# Patient Record
Sex: MALE | Race: BLACK OR AFRICAN AMERICAN | NOT HISPANIC OR LATINO | URBAN - METROPOLITAN AREA
[De-identification: names, ages, dates, MRNs, and addresses within clinical notes are randomized per-mention and may not be internally consistent; named-entity substitution may affect disease eponyms.]

---

## 2019-01-10 ENCOUNTER — OFFICE VISIT (OUTPATIENT)
Dept: DERMATOLOGY | Facility: CLINIC | Age: 24
End: 2019-01-10
Payer: COMMERCIAL

## 2019-01-10 DIAGNOSIS — L70.0 ACNE VULGARIS: Primary | ICD-10-CM

## 2019-01-10 DIAGNOSIS — B36.0 TINEA VERSICOLOR: ICD-10-CM

## 2019-01-10 PROCEDURE — 99999 PR PBB SHADOW E&M-NEW PATIENT-LVL II: CPT | Mod: PBBFAC,,, | Performed by: NURSE PRACTITIONER

## 2019-01-10 PROCEDURE — 99999 PR PBB SHADOW E&M-NEW PATIENT-LVL II: ICD-10-PCS | Mod: PBBFAC,,, | Performed by: NURSE PRACTITIONER

## 2019-01-10 PROCEDURE — 99202 OFFICE O/P NEW SF 15 MIN: CPT | Mod: S$GLB,,, | Performed by: NURSE PRACTITIONER

## 2019-01-10 PROCEDURE — 99202 PR OFFICE/OUTPT VISIT, NEW, LEVL II, 15-29 MIN: ICD-10-PCS | Mod: S$GLB,,, | Performed by: NURSE PRACTITIONER

## 2019-01-10 RX ORDER — DOXYCYCLINE 100 MG/1
TABLET ORAL
Qty: 30 TABLET | Refills: 2 | Status: SHIPPED | OUTPATIENT
Start: 2019-01-10

## 2019-01-10 RX ORDER — KETOCONAZOLE 20 MG/G
CREAM TOPICAL
Qty: 60 G | Refills: 1 | Status: SHIPPED | OUTPATIENT
Start: 2019-01-10

## 2019-01-10 RX ORDER — ADAPALENE AND BENZOYL PEROXIDE 3; 25 MG/G; MG/G
GEL TOPICAL
Qty: 45 G | Refills: 3 | Status: SHIPPED | OUTPATIENT
Start: 2019-01-10

## 2019-01-10 NOTE — PROGRESS NOTES
"  Subjective:       Patient ID:  Robbi Mac is a 23 y.o. male who presents for   Chief Complaint   Patient presents with    Acne     Face, x 1 year, rx topical cream for treatment     Acne  - Initial  Affected locations: face and neck  Duration: 5 years  Signs / symptoms: itching and dryness  Aggravated by: picking  Treatments tried: washes face once daily with Neutrogena non-medicated cleanser. Has been on Rx in the past but does not know which one.  Improvement on treatment: no relief    Pt is also c/o "dark spots" on his back x 2 yrs. No associated itching.    Review of Systems   Constitutional: Negative for fever, chills and fatigue.   Skin: Negative for daily sunscreen use, activity-related sunscreen use and recent sunburn.   Hematologic/Lymphatic: Does not bruise/bleed easily.        Objective:    Physical Exam   Constitutional: He appears well-developed and well-nourished. No distress.   Neurological: He is alert and oriented to person, place, and time. He is not disoriented.   Psychiatric: He has a normal mood and affect.   Skin:   Areas Examined (abnormalities noted in diagram):   Head / Face Inspection Performed  Neck Inspection Performed  Chest / Axilla Inspection Performed  Back Inspection Performed  RUE Inspected  LUE Inspection Performed                   Diagram Legend     Erythematous scaling macule/papule c/w actinic keratosis       Vascular papule c/w angioma      Pigmented verrucoid papule/plaque c/w seborrheic keratosis      Yellow umbilicated papule c/w sebaceous hyperplasia      Irregularly shaped tan macule c/w lentigo     1-2 mm smooth white papules consistent with Milia      Movable subcutaneous cyst with punctum c/w epidermal inclusion cyst      Subcutaneous movable cyst c/w pilar cyst      Firm pink to brown papule c/w dermatofibroma      Pedunculated fleshy papule(s) c/w skin tag(s)      Evenly pigmented macule c/w junctional nevus     Mildly variegated pigmented, slightly " irregular-bordered macule c/w mildly atypical nevus      Flesh colored to evenly pigmented papule c/w intradermal nevus       Pink pearly papule/plaque c/w basal cell carcinoma      Erythematous hyperkeratotic cursted plaque c/w SCC      Surgical scar with no sign of skin cancer recurrence      Open and closed comedones      Inflammatory papules and pustules      Verrucoid papule consistent consistent with wart     Erythematous eczematous patches and plaques     Dystrophic onycholytic nail with subungual debris c/w onychomycosis     Umbilicated papule    Erythematous-base heme-crusted tan verrucoid plaque consistent with inflamed seborrheic keratosis     Erythematous Silvery Scaling Plaque c/w Psoriasis     See annotation      Assessment / Plan:        Acne vulgaris  -     doxycycline monohydrate 100 mg Tab; Take 1 po qday  Dispense: 30 tablet; Refill: 2  -     EPIDUO FORTE 0.3-2.5 % GlwP; AAA face qhs  Dispense: 45 g; Refill: 3    Discussed benefits and risks of doxycyline therapy including but not limited to GI discomfort, esophageal irritation/ulceration, and increased sun sensitivity. Patient was counseled to take medicine with meals and at least 1 hour before lying down.     Wash face twice daily with Neutrogena facial cleanser.  Apply small amount of EpiduoForte q other night - increase to qhs as tolerated.  Recommend CeraVe facial moisturizer as needed for dryness.    Tinea versicolor  -     ketoconazole (NIZORAL) 2 % cream; AAA rash on chest and back bid x 2-4 wks  Dispense: 60 g; Refill: 1    Consider adding oral medication if no improvement at 1 month f/u appt.         Follow-up in about 1 month (around 2/10/2019).

## 2019-01-10 NOTE — PATIENT INSTRUCTIONS
Wash face twice daily with Neutrogena facial cleanser.    Take Doxycycline once daily.  - Take with meals and at least 1 hour before lying down.    Apply small amount of EpiduoForte every other night - increase to nightly as tolerated.    Recommend CeraVe facial moisturizer as needed for dryness.    Apply Ketoconazole cream twice daily to chest and back.    RETINOIDS           Your doctor has prescribed a topical retinoid for your skin. A retinoid is a vitamin A derived product used to treat a variety of skin conditions including acne, actinic keratoses (pre-skin cancers), uneven pigmentation from sun damage, fine lines and wrinkles, and enlarged pores.    How do they work?         Retinoids increase skin cell turn over from the normal 30 days to five or six days, minimizing clogged pores-the major factor in acne. Retinoids can also repair the DNA in cells damaged by the sun helping to even out skin pigmentation and clear pre-skin cancers. They can shrink oil glands and minimize the appearance of large pores. These effects can not be appreciated unless the medication is used on a consistent basis!    How do I use a retinoid?         After washing with a mild cleanser (Purpose, Aqua glycolic face cleanser, Cetaphil, Neutrogena deep cream cleanser), the skin should be moisturized with a non-retinol containing moisturizer such as Cerave PM. Then a thin layer of medication is applied to the forehead, nose cheeks, and chin (and around eyes if treating fine lines and wrinkles) at night. The amount of medication needed to cover the entire face should be no more than the size of a green pea. Irritation around the eyes can be treated with Vaseline at night.    What if my skin appears dry, red, and is peeling?          Retinoids do not cause dry skin but rather they cause the top layer of the skin the shed, giving an appearance of dry skin. In fact, new healthy skin cells are replacing older, damaged cells on the surface.  This usually occurs the first 2-4 weeks as the skin is adjusting to the medication. It is reasonable to use the medication every other night or even every 2 nights until your skin adjusts. You can use a MILD exfoliant to remove the peeling skin (Aveeno daily clarifying pads, Aqua glycolic face cream) and can apply a moisturizer throughout the day as needed. Retinoids come in a variety of strengths and vehicles and your doctor can find one best for you. If you cannot tolerate prescription strength retinoids, over the counter products with retinol may be beneficial. (Olay ProX wrinkle cream, NAYA deep wrinkle cream, Green Cream at [employer])    Will my skin be more sensitive in the sun?           You will need to use sunscreen with SPF 30 daily. Retinoids will cause the outermost layer of the skin to be thinner and thus more sensitive to ultraviolet rays. However, remember that over time, retinoids actually make the skin thicker by enhancing collagen deposition which protects the skin from sun damage.    When will I see results?            If you are using a retinoid for acne, you should see improvement in 6-8 weeks. Do not be alarmed if you find that your acne gets worse before it gets better-KEEP USING THE MEDICATION- this is a normal response and your acne will improve if you can stick with it.           If you are using the medication for anti-aging and skin dyspigmentation, you may see results in 3 months, but most effects are not visible until 6 months. Retinoids are clinically proven to reverse signs of aging, but only if used on a CONSTISTENT BASIS!             Remember that retinoids should not be used if you are pregnant.           Discontinue use 1 week prior to waxing, as skin is more likely to tear.

## 2020-12-31 ENCOUNTER — OFFICE VISIT (OUTPATIENT)
Dept: DERMATOLOGY | Facility: CLINIC | Age: 25
End: 2020-12-31
Payer: COMMERCIAL

## 2020-12-31 DIAGNOSIS — B36.0 TINEA VERSICOLOR: ICD-10-CM

## 2020-12-31 DIAGNOSIS — L70.0 ACNE VULGARIS: Primary | ICD-10-CM

## 2020-12-31 PROCEDURE — 1126F PR PAIN SEVERITY QUANTIFIED, NO PAIN PRESENT: ICD-10-PCS | Mod: S$GLB,,, | Performed by: DERMATOLOGY

## 2020-12-31 PROCEDURE — 99213 PR OFFICE/OUTPT VISIT, EST, LEVL III, 20-29 MIN: ICD-10-PCS | Mod: S$GLB,,, | Performed by: DERMATOLOGY

## 2020-12-31 PROCEDURE — 99999 PR PBB SHADOW E&M-EST. PATIENT-LVL III: ICD-10-PCS | Mod: PBBFAC,,, | Performed by: DERMATOLOGY

## 2020-12-31 PROCEDURE — 1126F AMNT PAIN NOTED NONE PRSNT: CPT | Mod: S$GLB,,, | Performed by: DERMATOLOGY

## 2020-12-31 PROCEDURE — 99213 OFFICE O/P EST LOW 20 MIN: CPT | Mod: S$GLB,,, | Performed by: DERMATOLOGY

## 2020-12-31 PROCEDURE — 99999 PR PBB SHADOW E&M-EST. PATIENT-LVL III: CPT | Mod: PBBFAC,,, | Performed by: DERMATOLOGY

## 2020-12-31 RX ORDER — TRETINOIN 0.5 MG/G
CREAM TOPICAL NIGHTLY
Qty: 20 G | Refills: 0 | Status: SHIPPED | OUTPATIENT
Start: 2020-12-31

## 2020-12-31 RX ORDER — TRETINOIN 0.25 MG/G
CREAM TOPICAL NIGHTLY
Status: CANCELLED | OUTPATIENT
Start: 2020-12-31

## 2020-12-31 RX ORDER — SELENIUM SULFIDE 22.5 MG/ML
SHAMPOO TOPICAL
Qty: 180 ML | Refills: 3 | Status: SHIPPED | OUTPATIENT
Start: 2020-12-31

## 2020-12-31 RX ORDER — ERYTHROMYCIN 20 MG/G
GEL TOPICAL
Qty: 30 G | Refills: 0 | Status: SHIPPED | OUTPATIENT
Start: 2020-12-31

## 2020-12-31 NOTE — PROGRESS NOTES
Subjective:       Patient ID:  Robbi Mac is a 25 y.o. male who presents for   Chief Complaint   Patient presents with    Acne     face     Patient is a 26 yo male present for Acne on Face present for 4 years.     Also has rash for months on the chest and arms.  No current tx.    Acne        Review of Systems   Constitutional: Negative for fever.   HENT: Negative for sore throat.    Respiratory: Negative for cough.    Skin: Positive for rash and dry skin.        Objective:    Physical Exam   Constitutional: He appears well-developed and well-nourished.   Eyes: No conjunctival no injection.   Neurological: He is alert and oriented to person, place, and time.   Psychiatric: He has a normal mood and affect.   Skin:   Areas Examined (abnormalities noted in diagram):   Head / Face Inspection Performed  Neck Inspection Performed  Chest / Axilla Inspection Performed  Abdomen Inspection Performed  RUE Inspected  LUE Inspection Performed                   Diagram Legend     Erythematous scaling macule/papule c/w actinic keratosis       Vascular papule c/w angioma      Pigmented verrucoid papule/plaque c/w seborrheic keratosis      Yellow umbilicated papule c/w sebaceous hyperplasia      Irregularly shaped tan macule c/w lentigo     1-2 mm smooth white papules consistent with Milia      Movable subcutaneous cyst with punctum c/w epidermal inclusion cyst      Subcutaneous movable cyst c/w pilar cyst      Firm pink to brown papule c/w dermatofibroma      Pedunculated fleshy papule(s) c/w skin tag(s)      Evenly pigmented macule c/w junctional nevus     Mildly variegated pigmented, slightly irregular-bordered macule c/w mildly atypical nevus      Flesh colored to evenly pigmented papule c/w intradermal nevus       Pink pearly papule/plaque c/w basal cell carcinoma      Erythematous hyperkeratotic cursted plaque c/w SCC      Surgical scar with no sign of skin cancer recurrence      Open and closed comedones      Inflammatory  papules and pustules      Verrucoid papule consistent consistent with wart     Erythematous eczematous patches and plaques     Dystrophic onycholytic nail with subungual debris c/w onychomycosis     Umbilicated papule    Erythematous-base heme-crusted tan verrucoid plaque consistent with inflamed seborrheic keratosis     Erythematous Silvery Scaling Plaque c/w Psoriasis     See annotation      Assessment / Plan:        Acne vulgaris  -     erythromycin with ethanoL (EMGEL) 2 % gel; AAA face qam  Dispense: 30 g; Refill: 0  -     tretinoin (RETIN-A) 0.05 % cream; Apply topically every evening. Start with qohs and move up to qhs after 2 weeks if not too dry.  Dispense: 20 g; Refill: 0  Reviewed with patient different treatment options and associated risks.  Reviewed with patient today acne treatment options of topicals, oral antibiotics, aldactone (if applicable), accutane, cosmetic PDT.  Pt prefers topicals and milder approach.  Proper application of medications and or care for affected area(s) and condition(s) reviewed.  Recommended a salicylic acid OTC wash to be used regularly for acne, keratosis pilaris, actinic keratoses, or as needed for oil control.    Tinea versicolor  Discussed with patient the etiology and pathogenesis of the disease or skin lesion(s) and possible treatments and aggravators.    Shower sooner than later after exercise, exertion, or sweating.  Can do wash cloth wipes if more convenient.  Sweat can cause irritation and may exacerbate skin conditions.  Use corn starch as a drying powder.  Chronic nature of this condition discussed with patient.  Proper application of medications and or care for affected area(s) and condition(s) reviewed.  ofelia sulf regularly.  For selenium sulfide or Selsun, use daily when spots are active for 3-5 minute soaks before rinsing off.  Then use weekly for prevention.  Explained to patient that tinea versicolor is a chronic condition and hypopigmentation can take  months to recolor or normalize.    Other orders  -     Cancel: tretinoin (RETIN-A) 0.025 % cream; Apply topically every evening.  Dispense:               Follow up in about 6 weeks (around 2/11/2021).

## 2021-01-22 ENCOUNTER — DOCUMENTATION ONLY (OUTPATIENT)
Dept: DERMATOLOGY | Facility: CLINIC | Age: 26
End: 2021-01-22

## 2021-04-28 ENCOUNTER — PATIENT MESSAGE (OUTPATIENT)
Dept: RESEARCH | Facility: HOSPITAL | Age: 26
End: 2021-04-28

## 2023-05-20 ENCOUNTER — OFFICE VISIT (OUTPATIENT)
Dept: URGENT CARE | Facility: CLINIC | Age: 28
End: 2023-05-20
Payer: COMMERCIAL

## 2023-05-20 VITALS
BODY MASS INDEX: 20.04 KG/M2 | HEART RATE: 55 BPM | HEIGHT: 70 IN | DIASTOLIC BLOOD PRESSURE: 85 MMHG | SYSTOLIC BLOOD PRESSURE: 134 MMHG | WEIGHT: 140 LBS | TEMPERATURE: 97 F | OXYGEN SATURATION: 100 % | RESPIRATION RATE: 16 BRPM

## 2023-05-20 DIAGNOSIS — Z11.3 SCREENING FOR STDS (SEXUALLY TRANSMITTED DISEASES): Primary | ICD-10-CM

## 2023-05-20 PROCEDURE — 80074 ACUTE HEPATITIS PANEL: CPT | Performed by: NURSE PRACTITIONER

## 2023-05-20 PROCEDURE — 87591 N.GONORRHOEAE DNA AMP PROB: CPT | Performed by: NURSE PRACTITIONER

## 2023-05-20 PROCEDURE — 99214 PR OFFICE/OUTPT VISIT, EST, LEVL IV, 30-39 MIN: ICD-10-PCS | Mod: S$GLB,,, | Performed by: NURSE PRACTITIONER

## 2023-05-20 PROCEDURE — 86592 SYPHILIS TEST NON-TREP QUAL: CPT | Performed by: NURSE PRACTITIONER

## 2023-05-20 PROCEDURE — 99214 OFFICE O/P EST MOD 30 MIN: CPT | Mod: S$GLB,,, | Performed by: NURSE PRACTITIONER

## 2023-05-20 PROCEDURE — 87389 HIV-1 AG W/HIV-1&-2 AB AG IA: CPT | Performed by: NURSE PRACTITIONER

## 2023-05-20 NOTE — PATIENT INSTRUCTIONS
We will call with results in the next 3 days   Avoid sexual activity until results are received      You must understand that you've received an Urgent Care treatment only and that you may be released before all your medical problems are known or treated. You, the patient, will arrange for follow up care as instructed.  If your condition worsens we recommend that you receive another evaluation at the emergency room immediately or contact your primary medical clinics after hours call service to discuss your concerns.  Please return here or go to the Emergency Department for any concerns or worsening of condition.

## 2023-05-20 NOTE — PROGRESS NOTES
"Subjective:      Patient ID: Robbi Mac Jr. is a 27 y.o. male.    Vitals:  height is 5' 10" (1.778 m) and weight is 63.5 kg (140 lb). His temperature is 97.1 °F (36.2 °C). His blood pressure is 134/85 and his pulse is 55 (abnormal). His respiration is 16 and oxygen saturation is 100%.     Chief Complaint: std testing    Pt is coming in for STD testing.   States he has not had any known exposure, but wanted to get tested today.  ROS   Objective:     Physical Exam   Constitutional: He is oriented to person, place, and time. He appears well-developed. No distress.   HENT:   Head: Normocephalic and atraumatic.   Ears:   Right Ear: External ear normal.   Left Ear: External ear normal.   Nose: Nose normal. No nasal deformity. No epistaxis.   Mouth/Throat: Oropharynx is clear and moist and mucous membranes are normal.   Eyes: Conjunctivae and lids are normal.   Neck: Trachea normal and phonation normal. Neck supple.   Cardiovascular: Regular rhythm.   Pulmonary/Chest: Effort normal.   Abdominal: Normal appearance. He exhibits no distension. Soft. There is no abdominal tenderness.   Musculoskeletal: Normal range of motion.         General: Normal range of motion.   Neurological: He is alert and oriented to person, place, and time. He has normal reflexes.   Skin: Skin is warm, dry, intact and not diaphoretic.   Psychiatric: His speech is normal and behavior is normal. Judgment and thought content normal.   Nursing note and vitals reviewed.    Assessment:     1. Screening for STDs (sexually transmitted diseases)        Plan:       Patient would like to wait until results are received before treating. No current symptoms at this time.     Screening for STDs (sexually transmitted diseases)  -     C. trachomatis/N. gonorrhoeae by AMP DNA [facility]; Urine  -     Trichomonas vaginalis, RNA, Qual, Urine (Males Only)  -     RPR  -     HIV 1/2 Ag/Ab (4th Gen)  -     HEPATITIS PANEL, ACUTE                Patient Instructions   We will " call with results in the next 3 days   Avoid sexual activity until results are received      You must understand that you've received an Urgent Care treatment only and that you may be released before all your medical problems are known or treated. You, the patient, will arrange for follow up care as instructed.  If your condition worsens we recommend that you receive another evaluation at the emergency room immediately or contact your primary medical clinics after hours call service to discuss your concerns.  Please return here or go to the Emergency Department for any concerns or worsening of condition.

## 2023-05-21 LAB
HAV IGM SERPL QL IA: NORMAL
HBV CORE IGM SERPL QL IA: NORMAL
HBV SURFACE AG SERPL QL IA: NORMAL
HCV AB SERPL QL IA: NORMAL
HIV 1+2 AB+HIV1 P24 AG SERPL QL IA: NORMAL

## 2023-05-22 LAB — RPR SER QL: NORMAL

## 2023-05-23 LAB
C TRACH DNA SPEC QL NAA+PROBE: NOT DETECTED
N GONORRHOEA DNA SPEC QL NAA+PROBE: NOT DETECTED

## 2023-05-26 ENCOUNTER — TELEPHONE (OUTPATIENT)
Dept: URGENT CARE | Facility: CLINIC | Age: 28
End: 2023-05-26
Payer: COMMERCIAL

## 2023-05-26 NOTE — TELEPHONE ENCOUNTER
Pt was seen on 5/20 for STI testing. All negative results. Trich is pending. Called patient at this time to review but no answer. I left a voice mail to call us back.

## 2025-01-29 ENCOUNTER — OFFICE VISIT (OUTPATIENT)
Dept: URGENT CARE | Facility: CLINIC | Age: 30
End: 2025-01-29
Payer: COMMERCIAL

## 2025-01-29 ENCOUNTER — HOSPITAL ENCOUNTER (OUTPATIENT)
Dept: RADIOLOGY | Facility: HOSPITAL | Age: 30
Discharge: HOME | End: 2025-01-29
Attending: FAMILY MEDICINE
Payer: COMMERCIAL

## 2025-01-29 ENCOUNTER — HOSPITAL ENCOUNTER (OUTPATIENT)
Dept: RADIOLOGY | Facility: OTHER | Age: 30
Discharge: HOME | End: 2025-01-29
Attending: FAMILY MEDICINE
Payer: COMMERCIAL

## 2025-01-29 VITALS
HEIGHT: 70 IN | SYSTOLIC BLOOD PRESSURE: 126 MMHG | RESPIRATION RATE: 19 BRPM | WEIGHT: 140 LBS | TEMPERATURE: 99 F | DIASTOLIC BLOOD PRESSURE: 79 MMHG | HEART RATE: 56 BPM | OXYGEN SATURATION: 100 % | BODY MASS INDEX: 20.04 KG/M2

## 2025-01-29 DIAGNOSIS — R10.31 RIGHT LOWER QUADRANT PAIN: ICD-10-CM

## 2025-01-29 DIAGNOSIS — R10.2 PELVIC PAIN: Primary | ICD-10-CM

## 2025-01-29 DIAGNOSIS — N50.811 RIGHT TESTICULAR PAIN: ICD-10-CM

## 2025-01-29 DIAGNOSIS — K83.8 DILATED INTRAHEPATIC BILE DUCT: ICD-10-CM

## 2025-01-29 LAB
BILIRUBIN, UA POC OHS: NEGATIVE
BLOOD, UA POC OHS: NEGATIVE
CLARITY, UA POC OHS: CLEAR
COLOR, UA POC OHS: YELLOW
GLUCOSE, UA POC OHS: NEGATIVE
KETONES, UA POC OHS: ABNORMAL
LEUKOCYTES, UA POC OHS: NEGATIVE
NITRITE, UA POC OHS: NEGATIVE
PH, UA POC OHS: 7
PROTEIN, UA POC OHS: 30
SPECIFIC GRAVITY, UA POC OHS: 1.02
UROBILINOGEN, UA POC OHS: 1

## 2025-01-29 PROCEDURE — 74177 CT ABD & PELVIS W/CONTRAST: CPT | Mod: TC

## 2025-01-29 PROCEDURE — 76870 US EXAM SCROTUM: CPT | Mod: TC

## 2025-01-29 PROCEDURE — 93975 VASCULAR STUDY: CPT | Mod: 26,,, | Performed by: RADIOLOGY

## 2025-01-29 PROCEDURE — 99214 OFFICE O/P EST MOD 30 MIN: CPT | Mod: 25,S$GLB,, | Performed by: FAMILY MEDICINE

## 2025-01-29 PROCEDURE — 74177 CT ABD & PELVIS W/CONTRAST: CPT | Mod: 26,,, | Performed by: RADIOLOGY

## 2025-01-29 PROCEDURE — 87491 CHLMYD TRACH DNA AMP PROBE: CPT | Performed by: FAMILY MEDICINE

## 2025-01-29 PROCEDURE — 25500020 PHARM REV CODE 255: Performed by: FAMILY MEDICINE

## 2025-01-29 PROCEDURE — 76870 US EXAM SCROTUM: CPT | Mod: 26,,, | Performed by: RADIOLOGY

## 2025-01-29 PROCEDURE — 81003 URINALYSIS AUTO W/O SCOPE: CPT | Mod: QW,S$GLB,, | Performed by: FAMILY MEDICINE

## 2025-01-29 PROCEDURE — 87086 URINE CULTURE/COLONY COUNT: CPT | Performed by: FAMILY MEDICINE

## 2025-01-29 PROCEDURE — 96372 THER/PROPH/DIAG INJ SC/IM: CPT | Mod: S$GLB,,, | Performed by: FAMILY MEDICINE

## 2025-01-29 RX ORDER — CEFTRIAXONE 500 MG/1
500 INJECTION, POWDER, FOR SOLUTION INTRAMUSCULAR; INTRAVENOUS
Status: COMPLETED | OUTPATIENT
Start: 2025-01-29 | End: 2025-01-29

## 2025-01-29 RX ORDER — DOXYCYCLINE HYCLATE 100 MG
100 TABLET ORAL 2 TIMES DAILY
Qty: 20 TABLET | Refills: 0 | Status: SHIPPED | OUTPATIENT
Start: 2025-01-29 | End: 2025-02-08

## 2025-01-29 RX ORDER — LIDOCAINE HYDROCHLORIDE 10 MG/ML
1 INJECTION, SOLUTION INFILTRATION; PERINEURAL
Status: COMPLETED | OUTPATIENT
Start: 2025-01-29 | End: 2025-01-29

## 2025-01-29 RX ORDER — IBUPROFEN 600 MG/1
600 TABLET ORAL EVERY 8 HOURS PRN
Qty: 30 TABLET | Refills: 0 | Status: SHIPPED | OUTPATIENT
Start: 2025-01-29 | End: 2025-02-08

## 2025-01-29 RX ADMIN — CEFTRIAXONE 500 MG: 500 INJECTION, POWDER, FOR SOLUTION INTRAMUSCULAR; INTRAVENOUS at 01:01

## 2025-01-29 RX ADMIN — IOHEXOL 65 ML: 350 INJECTION, SOLUTION INTRAVENOUS at 06:01

## 2025-01-29 RX ADMIN — LIDOCAINE HYDROCHLORIDE 1 ML: 10 INJECTION, SOLUTION INFILTRATION; PERINEURAL at 01:01

## 2025-01-29 RX ADMIN — IOHEXOL 25 ML: 350 INJECTION, SOLUTION INTRAVENOUS at 06:01

## 2025-01-29 NOTE — PROGRESS NOTES
"Subjective:      Patient ID: Robbi Mac Jr. is a 29 y.o. male.    Vitals:  height is 5' 10" (1.778 m) and weight is 63.5 kg (139 lb 15.9 oz). His oral temperature is 98.6 °F (37 °C). His blood pressure is 126/79 and his pulse is 56 (abnormal). His respiration is 19 and oxygen saturation is 100%.     Chief Complaint: Urinary Tract Infection (Pelvis pain. - Entered by patient) and Pelvic Pain    29 year old co pelvic pain that gas been going on since 01/26/2025. Pt stated he used tylenol to relieve pain. Pt stated tylenol did relieve pain for a little bit. No urinary problems    Pelvic Pain  This is a new problem. The current episode started in the past 7 days. The problem occurs daily. The problem has been gradually worsening. Nothing aggravates the symptoms. He has tried acetaminophen for the symptoms. The treatment provided mild relief.       Genitourinary:  Positive for pelvic pain.      Objective:     Physical Exam  Constitutional: Pt oriented to person, place, and time.  Non-toxic appearance.   Patient does not appear ill. No distress. normal  HENT: No icterus or facial swelling appreciated  Head: Normocephalic and atraumatic.   Nose: No congestion.   Pulmonary/Chest: Effort normal. No stridor. No respiratory distress.   Abdominal: Normal appearance. Abdomen exhibits no distension. + TTP RLQ, no rebound or guarding. There is palpable mass (1cm) inguinal mass, right side, non reducible. Soft. Mild TTP. No overlying erythema or drainage.   : no penile rashes or swelling. No scrotal swelling. There is palpable cordlike mass 1-2 cm posterior right scrotum. Mild TTP. No erythema. No scrotal rashes.   Musculoskeletal:         General: No swelling.   Neurological: no focal deficit. Patient is alert and oriented to person, place, and time.   Skin: Skin is not diaphoretic and not pale. no jaundice  Psychiatric: Patients behavior is normal. Mood, judgment and thought content normal.     Results for orders placed or " performed in visit on 01/29/25   POCT Urinalysis(Instrument)    Collection Time: 01/29/25 12:27 PM   Result Value Ref Range    Color, POC UA Yellow Yellow, Straw, Colorless    Clarity, POC UA Clear Clear    Glucose, POC UA Negative Negative    Bilirubin, POC UA Negative Negative    Ketones, POC UA Trace (A) Negative    Spec Grav POC UA 1.020 1.005 - 1.030    Blood, POC UA Negative Negative    pH, POC UA 7.0 5.0 - 8.0    Protein, POC UA 30 (A) Negative    Urobilinogen, POC UA 1.0 <=1.0    Nitrite, POC UA Negative Negative    WBC, POC UA Negative Negative   C. trachomatis/N. gonorrhoeae by AMP DNA [facility]; Urine    Collection Time: 01/29/25 12:50 PM   Result Value Ref Range    Chlamydia, Amplified DNA Detected (A) Not Detected    N gonorrhoeae, amplified DNA Not Detected Not Detected   Urine Culture High Risk    Collection Time: 01/29/25  1:13 PM    Specimen: Urine, Clean Catch   Result Value Ref Range    Urine Culture, Routine No growth        CT A/P     Impression:     Mild intrahepatic biliary ductal dilatation.  Consider nonemergent outpatient MRCP or ERCP.        Electronically signed by:Nirmala Nielson  Date:                                            01/29/2025  Time:                                           19:43    Narrative & Impression  EXAMINATION:  US SCROTUM AND TESTICLES WITH DOPPLER (XPD)     CLINICAL HISTORY:  Right testicular pain     TECHNIQUE:  Sonography of the scrotum and testes.     COMPARISON:  None     FINDINGS:  Right Testicle:     - size: 4.0 x 2.9 x 3.2 cm     - appearance: Normal.     - flow: Normal arterial and venous flow     - epididymis: Normal.     - hydrocele: Small.     - varicocele: Present.     Left Testicle:     - size: 3.1 x 3.6 x 3.2 cm     - appearance: Normal.     - flow: Normal arterial and venous flow     - epididymis: Normal.     - hydrocele: None.     - varicocele: None.     Other findings: None.     Impression:     Small right hydrocele.  Right varicocele.     Normal  appearance of the testicles.     Electronically signed by resident: Bhaskar Echevarria  Date:                                            01/29/2025  Time:                                           16:23     Electronically signed by:Jason Kamara  Date:                                            01/29/2025  Time:                                           16:30  Assessment:     1. Pelvic pain    2. Right lower quadrant pain    3. Right testicular pain    4. Dilated intrahepatic bile duct        Plan:       Pelvic pain  -     POCT Urinalysis(Instrument)  -     C. trachomatis/N. gonorrhoeae by AMP HE[facility]; Urine  -     ibuprofen (ADVIL,MOTRIN) 600 MG tablet; Take 1 tablet (600 mg total) by mouth every 8 (eight) hours as needed for Pain.  Dispense: 30 tablet; Refill: 0  -     Ambulatory referral/consult to Internal Medicine    Right lower quadrant pain  -     CREATININE, SERUM; Future; Expected date: 01/29/2025  -     CT Abdomen Pelvis With IV Contrast Routine Oral Contrast; Future; Expected date: 01/29/2025  -     ibuprofen (ADVIL,MOTRIN) 600 MG tablet; Take 1 tablet (600 mg total) by mouth every 8 (eight) hours as needed for Pain.  Dispense: 30 tablet; Refill: 0  -     Ambulatory referral/consult to Gastroenterology  -     Ambulatory referral/consult to Internal Medicine    Right testicular pain  Possible epididymitis.   -     doxycycline (VIBRA-TABS) 100 MG tablet; Take 1 tablet (100 mg total) by mouth 2 (two) times daily. for 10 days  Dispense: 20 tablet; Refill: 0  -     cefTRIAXone injection 500 mg  -     LIDOcaine HCL 10 mg/ml (1%) injection 1 mL  -     Urine Culture High Risk  -     US SCROTUM AND TESTICLES WITH DOPPLER (XPD); Future; Expected date: 01/29/2025  -     Ambulatory referral/consult to Internal Medicine    Dilated intrahepatic bile duct  Seen on CT, likely incidental finding. D/w patient. Needs f/u with pcp and/or GI. Referrals placed. Pt expressed understanding.    -     Ambulatory referral/consult to  Gastroenterology  -     Ambulatory referral/consult to Internal Medicine      Addend: Chlam + send out. Pt had been tx for possible epididymitis at time of visit.

## 2025-01-29 NOTE — PATIENT INSTRUCTIONS
Will get imaging of the right lower part of the abdomen and the testicles today to assess for emergencies such as testicular torsion and appendicitis.     Recommend completing the studies today as ordered.     In meantime, can be a condition called epididymitis, or infection/inflammation in the testicle and so we are treating for this condition and will follow up urine studies for signs of infection (can take up to 5 days)

## 2025-01-30 LAB — BACTERIA UR CULT: NO GROWTH

## 2025-02-01 LAB
C TRACH DNA SPEC QL NAA+PROBE: DETECTED
N GONORRHOEA DNA SPEC QL NAA+PROBE: NOT DETECTED